# Patient Record
(demographics unavailable — no encounter records)

---

## 2024-10-16 NOTE — DISCUSSION/SUMMARY
[de-identified] : This patient presents today for follow-up regarding calcaneus fracture of the right foot.  Patient's x-ray looks stable to previous x-rays.  I did ask him to see Dr. Sanchez in consultation because foot and ankle is his specialty.  She did not go to see him as of yet.  I encouraged her to see him this week without failure.  She will continue the cam walker and toe-touch weightbearing with crutches.  Will continue to observe the hand for now.  We did inject the hand with Depo-Medrol lidocaine on today's visit.  At least 40 minutes was spent performing the evaluation and management on today's office visit.  This includes but is not limited to preparing to see patient including review of any test results or outside medical records, obtaining and/or reviewing separately obtained history, performing examination and evaluation, counseling and educating the patient on their diagnosis and treatment recommendations, ordering medications, tests, or procedures, documenting clinical information in the electronic health record, independently interpreting results (not separately reported) and communicating results to the patient, and coordination of care.

## 2024-10-16 NOTE — PHYSICAL EXAM
[de-identified] : The patient appears well nourished  and in no apparent distress.  The patient is alert and oriented to person, place, and time.   Affect and mood appear normal.    The head is normocephalic and atraumatic.  The eyes reveal normal sclera and extra ocular muscles are intact.   The neck appears normal with no jugular venous distention or masses noted.   Skin shows normal turgor with no evidence of eczema or psoriasis.  No respiratory distress noted.  The patient is unable to ambulate secondary to pain.  The right ankle has decreased range of motion secondary to pain.  Swelling and ecchymosis have reduced.  There is tenderness about the calcaneal tuberosity..  There is no warmth or erythema.    There is no instability to inversion or to anterior drawer.  Strength was not tested secondary to the injury.  Sensation is intact. Pulses and capillary refill are normal.    No lymphadenopathy noted.   The left wrist has mild loss of range of motion.  Tenderness over the oblique pulley of the thumb.  No active triggering.  Tenderness to palpation over the dorsal aspect of the wrist and the metacarpals.  Mild soft tissue swelling and ecchymosis noted as well.  No instability is noted.  There is no warmth or erythema.   strength is not tested secondary to the injury.  Pulses and capillary refill are normal.   No clubbing, cyanosis, or edema noted.  No lymphadenopathy noted.   [de-identified] : X-ray of the calcaneus lateral and axial view reveals there is a calcaneal tuberosity fracture with some slight proximal migration of the tuberosity fragment.  There is also evidence of a fracture line that extends up towards the subtalar joint which appears nondisplaced.

## 2024-10-16 NOTE — HISTORY OF PRESENT ILLNESS
[de-identified] : This patient presents today for follow-up regarding right calcaneus fracture.  I did ask him to see Dr. Sanchez but she has not gone to see him as of yet.  She is wearing the cam walker at all times and using crutches.  She does note some swelling about the ankle especially with prolonged standing.  On today's visit she complains of thumb pain with active triggering.  She has been wearing a cock-up wrist splint for the wrist sprain.  She notes intermittent triggering and locking about the thumb.  Denies numbness and tingling.

## 2024-10-16 NOTE — PROCEDURE
[de-identified] : Using sterile technique, 0.5cc of depomedrol (40mg/ml) and 0.5cc of 1% plain lidocaine was drawn up into a sterile 3cc syringe.  A sterile 25 gauge needle was then affixed to the syringe.  The base of the left thumb was then sterilely prepped with chlorhexidine and ethylene chloride spray was used as an anesthetic prior to injection.  The depomedrol/lidocaine mixture was injected into the flexor sheath of the digit.  The patient tolerated the procedure well without difficulty.  The patient was given instructions on the use of ice and anti-inflammatories post injection site soreness.

## 2024-10-21 NOTE — HEALTH RISK ASSESSMENT
[Good] : ~his/her~  mood as  good [Yes] : Yes [2 - 3 times a week (3 pts)] : 2 - 3  times a week (3 points) [1 or 2 (0 pts)] : 1 or 2 (0 points) [MammogramComments] : referral today

## 2024-10-21 NOTE — DISCUSSION/SUMMARY
[Surgical risks reviewed] : Surgical risks reviewed [de-identified] : Discussed with patient nature of condition, potential course / sequelae, options reviewed.  Continue CAM boot immobilization at all times (night splint for bedtime use), non-weight bearing status, activity modification, HEP, CT scan assessment.  Return to office in 10 - 14 days / PRN, advanced imaging study review.  All questions answered.

## 2024-10-21 NOTE — HISTORY OF PRESENT ILLNESS
[A CAM Boot] : a CAM boot [FreeTextEntry1] : Reports sustaining calcaneus fracture R hindfoot secondary to hard landing misstep at bottom of stairway (9/10/24).  Followed by Dr. Mcmanus with radiographs (9/11/24, 9/18/24, and 10/16/24).  Wearing CAM boot immobilization during daytime, TTWB status.  PMH includes osteoporosis (Alendronate medication).

## 2024-10-21 NOTE — HISTORY OF PRESENT ILLNESS
[de-identified] : 56y/o F PMHx Anxiety Depression (Zoloft Wellbutrin, Klonopin ), Hypothyroid (Levothyroxine) presents for routine PE. Wearing boot on right foot for calcaneus fracture after sustaining a fall 2 months ago

## 2024-10-21 NOTE — PHYSICAL EXAM
[de-identified] : Extremity: skin intact R LE (including posterior hindfoot), Castillo testing with good plantarflexion response R LE, able to actively plantarflex R ankle, residual tenderness calcaneal tuber R hindfoot.  Nontender R ankle, peroneals, syndesmosis, Achilles, midfoot LF and PTT insertional, and forefoot.  Calves soft and nontender, sensorimotor unchanged, skin as aforementioned R LE.  AOx3, mood / affect normal. [de-identified] : Radiographs (10/16/24) reveal fracture comminution R calcaneus with proximal migration tuberosity essentially unchanged [since 9/18/24] and intra-articular extension.

## 2024-12-03 NOTE — HISTORY OF PRESENT ILLNESS
[FreeTextEntry8] : 55 yo female with PMHx anxiety/depression, osteoporosis, hypothyroid, presents to the office for cough/congestion, sinus pressure. reports symptoms started mid last week, felt congested with sore throat. has post nasal drip, developing into persistent coughing, keeping patient up at night due to coughing. has some chills. tried OTC medications without relief.

## 2024-12-03 NOTE — REVIEW OF SYSTEMS
[Chills] : chills [Fatigue] : fatigue [Night Sweats] : night sweats [Earache] : no earache [Hearing Loss] : no hearing loss [Nosebleed] : no nosebleeds [Hoarseness] : hoarseness [Nasal Discharge] : nasal discharge [Sore Throat] : sore throat [Postnasal Drip] : postnasal drip [Shortness Of Breath] : no shortness of breath [Wheezing] : no wheezing [Cough] : cough [Dyspnea on Exertion] : dyspnea on exertion [Headache] : headache [Dizziness] : no dizziness [Fainting] : no fainting [Confusion] : no confusion [Memory Loss] : no memory loss [Unsteady Walking] : no ataxia [Negative] : Psychiatric

## 2024-12-03 NOTE — PHYSICAL EXAM
[No Acute Distress] : no acute distress [Normal Voice/Communication] : normal voice/communication [Ill-Appearing] : ill-appearing [No Lymphadenopathy] : no lymphadenopathy [No Respiratory Distress] : no respiratory distress  [No Accessory Muscle Use] : no accessory muscle use [Normal Rate] : normal rate  [Regular Rhythm] : with a regular rhythm [Normal S1, S2] : normal S1 and S2 [Normal Supraclavicular Nodes] : no supraclavicular lymphadenopathy [Coordination Grossly Intact] : coordination grossly intact [No Focal Deficits] : no focal deficits [Normal Gait] : normal gait [Speech Grossly Normal] : speech grossly normal [Alert and Oriented x3] : oriented to person, place, and time [Normal Mood] : the mood was normal [Normal] : affect was normal and insight and judgment were intact [de-identified] : PND, bilateral TM fluid [de-identified] : rhonchi bilaterally

## 2024-12-03 NOTE — PHYSICAL EXAM
[No Acute Distress] : no acute distress [Normal Voice/Communication] : normal voice/communication [Ill-Appearing] : ill-appearing [No Lymphadenopathy] : no lymphadenopathy [No Respiratory Distress] : no respiratory distress  [No Accessory Muscle Use] : no accessory muscle use [Normal Rate] : normal rate  [Regular Rhythm] : with a regular rhythm [Normal S1, S2] : normal S1 and S2 [Normal Supraclavicular Nodes] : no supraclavicular lymphadenopathy [Coordination Grossly Intact] : coordination grossly intact [No Focal Deficits] : no focal deficits [Normal Gait] : normal gait [Speech Grossly Normal] : speech grossly normal [Alert and Oriented x3] : oriented to person, place, and time [Normal Mood] : the mood was normal [Normal] : affect was normal and insight and judgment were intact [de-identified] : PND, bilateral TM fluid [de-identified] : rhonchi bilaterally

## 2024-12-24 NOTE — HISTORY OF PRESENT ILLNESS
[FreeTextEntry8] : 57 y/o F Presents for ;left armpit lump noticed 4 days ago.  Had recent Mammo and Breast US 2 weeks ago all negative.  Having difficulty swallowing. Food not getting stuck but difficult swallowing pills. Sees Endo for thyroid  Had Thyroid labs pending. LAst Thyroid US last year.

## 2024-12-24 NOTE — PHYSICAL EXAM
[Normal] : no jugular venous distention, supple, no lymphadenopathy and the thyroid was normal and there were no nodules present [de-identified] : Left axilla fullness> right [de-identified] : ? left axillary nodfe Non tedner

## 2024-12-24 NOTE — REVIEW OF SYSTEMS
[Negative] : Cardiovascular [FreeTextEntry4] : difficulty swallowing [de-identified] : lump under left armpit

## 2024-12-30 NOTE — DISCUSSION/SUMMARY
[de-identified] : Patient presents today for follow-up regarding continued left wrist pain.  The injections seem to help the trigger thumb however she now she is complaining of more pain over the radiocarpal joint.  Due to the patient's continued pain I recommend an MRI to evaluate the radiocarpal joint for evidence of synovitis or early degenerative arthritis or ligament injury.  I will see her back after the MRI for follow-up and review.  I also recommend a course of meloxicam 15 mg each day for 2 weeks.  This should help with her hip symptoms as well as the wrist symptoms.  I recommended a course of Mobic, 15mg per day for the next 2 weeks.  The patient was given a prescription for the Mobic with directions to take it once daily with the first meal of the day.  They were instructed to stop the medicine and call the office if there are any adverse reactions to the medicine.  At least 30 minutes was spent performing the evaluation and management on today's office visit.  This includes but is not limited to preparing to see patient including review of any test results or outside medical records, obtaining and/or reviewing separately obtained history, performing examination and evaluation, counseling and educating the patient on their diagnosis and treatment recommendations, ordering medications, tests, or procedures, documenting clinical information in the electronic health record, independently interpreting results (not separately reported) and communicating results to the patient, and coordination of care.

## 2024-12-30 NOTE — HISTORY OF PRESENT ILLNESS
[de-identified] : This patient presents today for follow-up and reevaluation regarding left thumb trigger finger.  She did have a cortisone injection in October of this year which gave her some relief of her symptoms.  She no longer has any pain but she does have occasional triggering of the left thumb.  She is noting some pain over the radiocarpal joint.  In addition she is given complaining about pain in the left hip which has been ongoing for a number of months after a fall she sustained in September.  Pain in the hip is 2 out of 10.  Pain in the wrist 5 out of 10.  Pain radiates around the lateral aspect of the hip joint in the area of the trochanter and the gluteus medius.  Pain worse when she goes from sitting to a standing position.  Pain also worse with ambulation and stair climbing.  Pain in the wrist is worse when she  objects or does any repetitive activity.  Patient feels the pain is better with rest and with Tylenol.  She presents today for follow-up and reevaluation.

## 2024-12-30 NOTE — HISTORY OF PRESENT ILLNESS
[de-identified] : This patient presents today for follow-up and reevaluation regarding left thumb trigger finger.  She did have a cortisone injection in October of this year which gave her some relief of her symptoms.  She no longer has any pain but she does have occasional triggering of the left thumb.  She is noting some pain over the radiocarpal joint.  In addition she is given complaining about pain in the left hip which has been ongoing for a number of months after a fall she sustained in September.  Pain in the hip is 2 out of 10.  Pain in the wrist 5 out of 10.  Pain radiates around the lateral aspect of the hip joint in the area of the trochanter and the gluteus medius.  Pain worse when she goes from sitting to a standing position.  Pain also worse with ambulation and stair climbing.  Pain in the wrist is worse when she  objects or does any repetitive activity.  Patient feels the pain is better with rest and with Tylenol.  She presents today for follow-up and reevaluation.

## 2024-12-30 NOTE — REASON FOR VISIT
[Follow-Up Visit] : a follow-up visit for [FreeTextEntry2] : Sprain of wrist, left . New problem left hip pain after fall 9/10/24

## 2024-12-30 NOTE — DISCUSSION/SUMMARY
[de-identified] : Patient presents today for follow-up regarding continued left wrist pain.  The injections seem to help the trigger thumb however she now she is complaining of more pain over the radiocarpal joint.  Due to the patient's continued pain I recommend an MRI to evaluate the radiocarpal joint for evidence of synovitis or early degenerative arthritis or ligament injury.  I will see her back after the MRI for follow-up and review.  I also recommend a course of meloxicam 15 mg each day for 2 weeks.  This should help with her hip symptoms as well as the wrist symptoms.  I recommended a course of Mobic, 15mg per day for the next 2 weeks.  The patient was given a prescription for the Mobic with directions to take it once daily with the first meal of the day.  They were instructed to stop the medicine and call the office if there are any adverse reactions to the medicine.  At least 30 minutes was spent performing the evaluation and management on today's office visit.  This includes but is not limited to preparing to see patient including review of any test results or outside medical records, obtaining and/or reviewing separately obtained history, performing examination and evaluation, counseling and educating the patient on their diagnosis and treatment recommendations, ordering medications, tests, or procedures, documenting clinical information in the electronic health record, independently interpreting results (not separately reported) and communicating results to the patient, and coordination of care.

## 2024-12-30 NOTE — PHYSICAL EXAM
[de-identified] : The patient appears well nourished  and in no apparent distress.  The patient is alert and oriented to person, place, and time.   Affect and mood appear normal.    The head is normocephalic and atraumatic.  The eyes reveal normal sclera and extra ocular muscles are intact.   The neck appears normal with no jugular venous distention or masses noted.   Skin shows normal turgor with no evidence of eczema or psoriasis.  No respiratory distress noted.  The patient ambulates with a normal gait.  The left wrist has decreased range of motion of a mild degree in all directions.  Tenderness noted over the radiocarpal joint to palpation.  Pain noted with terminal extension and flexion of the wrist.  There is no ecchymosis.  No instability is noted.  There is no warmth or erythema.   strength is normal.  Pulses and capillary refill are normal.   No clubbing, cyanosis, or edema noted.  No lymphadenopathy noted.    Exam of the left hip reveals there is mild discomfort with range of motion.  There is some very mild tenderness over the trochanteric and the gluteus medius muscle as it inserts onto the trochanter.  Strength and sensation are intact distally.  Reflexes 2+ and symmetric.  Pulses are intact. [de-identified] : X-ray of the left hip is obtained.  AP and frog lateral views of the left hip reveal joint spaces well-maintained.  No fracture dislocation or degenerative disease is noted.

## 2024-12-30 NOTE — PHYSICAL EXAM
[de-identified] : The patient appears well nourished  and in no apparent distress.  The patient is alert and oriented to person, place, and time.   Affect and mood appear normal.    The head is normocephalic and atraumatic.  The eyes reveal normal sclera and extra ocular muscles are intact.   The neck appears normal with no jugular venous distention or masses noted.   Skin shows normal turgor with no evidence of eczema or psoriasis.  No respiratory distress noted.  The patient ambulates with a normal gait.  The left wrist has decreased range of motion of a mild degree in all directions.  Tenderness noted over the radiocarpal joint to palpation.  Pain noted with terminal extension and flexion of the wrist.  There is no ecchymosis.  No instability is noted.  There is no warmth or erythema.   strength is normal.  Pulses and capillary refill are normal.   No clubbing, cyanosis, or edema noted.  No lymphadenopathy noted.    Exam of the left hip reveals there is mild discomfort with range of motion.  There is some very mild tenderness over the trochanteric and the gluteus medius muscle as it inserts onto the trochanter.  Strength and sensation are intact distally.  Reflexes 2+ and symmetric.  Pulses are intact. [de-identified] : X-ray of the left hip is obtained.  AP and frog lateral views of the left hip reveal joint spaces well-maintained.  No fracture dislocation or degenerative disease is noted.

## 2025-02-14 NOTE — PROCEDURE
[de-identified] : Reason for laryngoscopy: Oral exam unable to account for symptoms.  Flexible scope #2 used. Passed through nasal passage and nasopharynx/oropharynx/hypopharynx clear. Supraglottis normal. Glottis with fully mobile vocal cords without lesions or masses. Visualized subglottis clear. Postcricoid area without erythema or edema. No pooling of secretions.

## 2025-02-14 NOTE — ASSESSMENT
[FreeTextEntry1] : sensation of fullness in throat, poor sleep: - laryngoscopy WNL - recommend home sleep study  - will call with results

## 2025-02-14 NOTE — HISTORY OF PRESENT ILLNESS
[de-identified] : 55yo female she notes sometimes she has issues with swallowing pills and also a sensation of difficulty breathing when lying flat and needs to sit up. No snoring at night but she notes poor sleep. She sometimes awakens at night feeling she can't breathe. She does sleep with her head elevated.

## 2025-02-25 NOTE — PHYSICAL EXAM
[de-identified] : The patient appears well nourished  and in no apparent distress.  The patient is alert and oriented to person, place, and time.   Affect and mood appear normal.    The head is normocephalic and atraumatic.  The eyes reveal normal sclera and extra ocular muscles are intact.   The neck appears normal with no jugular venous distention or masses noted.   Skin shows normal turgor with no evidence of eczema or psoriasis.  No respiratory distress noted.  The patient ambulates with a normal gait.  The left wrist has decreased range of motion of a mild degree in all directions.  Tenderness noted over scaphoid to palpation..  Pain noted with terminal extension and flexion of the wrist.  There is no ecchymosis.  No instability is noted.  There is no warmth or erythema.   strength is normal.  Pulses and capillary refill are normal.   No clubbing, cyanosis, or edema noted.  No lymphadenopathy noted.    Exam of the right foot reveals there is normal range of motion about the foot, hindfoot, and toes.  There is some tenderness about the calcaneal tuberosity.  There is also some slight deformity secondary to calcaneus fracture.  There is no soft tissue swelling noted. There is no eccyhmosis.  There is no warmth or erythema. Flexor and extensor tendons are intact. Pulses are intact.  Capillary refill is good.   Strength and sensation are normal.  There is no lymphadenopathy.       [de-identified] : AP, lateral, and oblique views of the left wrist were obtained.  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  The joint spaces are well maintained without arthritic changes.   AP, lateral, and oblique views of the right foot were obtained.  There is evidence of prior calcaneal fracture which is healed.  The joints spaces are well maintained without evidence of degenerative arthritis.  The Lisfranc joint is intact.  No tarsal coalitions are noted.

## 2025-02-25 NOTE — PHYSICAL EXAM
[de-identified] : The patient appears well nourished  and in no apparent distress.  The patient is alert and oriented to person, place, and time.   Affect and mood appear normal.    The head is normocephalic and atraumatic.  The eyes reveal normal sclera and extra ocular muscles are intact.   The neck appears normal with no jugular venous distention or masses noted.   Skin shows normal turgor with no evidence of eczema or psoriasis.  No respiratory distress noted.  The patient ambulates with a normal gait.  The left wrist has decreased range of motion of a mild degree in all directions.  Tenderness noted over scaphoid to palpation..  Pain noted with terminal extension and flexion of the wrist.  There is no ecchymosis.  No instability is noted.  There is no warmth or erythema.   strength is normal.  Pulses and capillary refill are normal.   No clubbing, cyanosis, or edema noted.  No lymphadenopathy noted.    Exam of the right foot reveals there is normal range of motion about the foot, hindfoot, and toes.  There is some tenderness about the calcaneal tuberosity.  There is also some slight deformity secondary to calcaneus fracture.  There is no soft tissue swelling noted. There is no eccyhmosis.  There is no warmth or erythema. Flexor and extensor tendons are intact. Pulses are intact.  Capillary refill is good.   Strength and sensation are normal.  There is no lymphadenopathy.       [de-identified] : AP, lateral, and oblique views of the left wrist were obtained.  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  The joint spaces are well maintained without arthritic changes.   AP, lateral, and oblique views of the right foot were obtained.  There is evidence of prior calcaneal fracture which is healed.  The joints spaces are well maintained without evidence of degenerative arthritis.  The Lisfranc joint is intact.  No tarsal coalitions are noted.

## 2025-02-25 NOTE — DISCUSSION/SUMMARY
[de-identified] : This patient presents today for follow-up regarding left wrist sprain and right heel pain secondary to calcaneal fracture.  Due to the patient's continued pain about the wrist we obtained an MRI.  The MRI confirms evidence of a scaphoid nonunion which was not noted on the initial x-rays during presentation in September.  Patient was placed into a cock-up wrist splint after the MRI results were revealed.  I recommend a follow-up evaluation with Dr. Taylor for possible bone grafting and open reduction internal fixation of the scaphoid fracture.  In regards to her calcaneal fracture, I recommend she modify her shoewear and I do think her symptoms will improve with over-the-counter anti-inflammatories.  Should she have continued symptoms she should follow back up with Dr. Sanchez for further treatment options.  At least 30 minutes was spent performing the evaluation and management on today's office visit.  This includes but is not limited to preparing to see patient including review of any test results or outside medical records, obtaining and/or reviewing separately obtained history, performing examination and evaluation, counseling and educating the patient on their diagnosis and treatment recommendations, ordering medications, tests, or procedures, documenting clinical information in the electronic health record, independently interpreting results (not separately reported) and communicating results to the patient, and coordination of care.

## 2025-02-25 NOTE — HISTORY OF PRESENT ILLNESS
[de-identified] : This patient presents today for follow-up regarding left wrist sprain.  Patient had an MRI to rule out ligamentous injury.  MRI noted to have a nondisplaced fracture scaphoid.  Patient was instructed to obtain a cock-up wrist splints and to see his back in the office as soon as possible for follow-up and reevaluation.  In addition she is complaining of pain in the heel.  She was treated by Dr. Sanchez for a calcaneal fracture.

## 2025-02-25 NOTE — HISTORY OF PRESENT ILLNESS
[de-identified] : This patient presents today for follow-up regarding left wrist sprain.  Patient had an MRI to rule out ligamentous injury.  MRI noted to have a nondisplaced fracture scaphoid.  Patient was instructed to obtain a cock-up wrist splints and to see his back in the office as soon as possible for follow-up and reevaluation.  In addition she is complaining of pain in the heel.  She was treated by Dr. Sanchez for a calcaneal fracture.

## 2025-02-25 NOTE — REASON FOR VISIT
[Follow-Up Visit] : a follow-up visit for [FreeTextEntry2] : left wrist sprain, tendinitis of left hip

## 2025-02-25 NOTE — DISCUSSION/SUMMARY
[de-identified] : This patient presents today for follow-up regarding left wrist sprain and right heel pain secondary to calcaneal fracture.  Due to the patient's continued pain about the wrist we obtained an MRI.  The MRI confirms evidence of a scaphoid nonunion which was not noted on the initial x-rays during presentation in September.  Patient was placed into a cock-up wrist splint after the MRI results were revealed.  I recommend a follow-up evaluation with Dr. Taylor for possible bone grafting and open reduction internal fixation of the scaphoid fracture.  In regards to her calcaneal fracture, I recommend she modify her shoewear and I do think her symptoms will improve with over-the-counter anti-inflammatories.  Should she have continued symptoms she should follow back up with Dr. Sanchez for further treatment options.  At least 30 minutes was spent performing the evaluation and management on today's office visit.  This includes but is not limited to preparing to see patient including review of any test results or outside medical records, obtaining and/or reviewing separately obtained history, performing examination and evaluation, counseling and educating the patient on their diagnosis and treatment recommendations, ordering medications, tests, or procedures, documenting clinical information in the electronic health record, independently interpreting results (not separately reported) and communicating results to the patient, and coordination of care.

## 2025-03-07 NOTE — DISCUSSION/SUMMARY
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  She also has a right trigger thumb.  I had a discussion regarding today's visit, the prognosis of this diagnosis and treatment recommendations and options.  At this time, with regard to the left scaphoid nonunion, I recommended open reduction and internal fixation with distal radius bone graft.  She told me that she is afraid of surgery and afraid of hospitals and she is not certain that she would like to proceed.  She is also an active smoker, I did tell her that this would further impair her healing with or without surgery.  With regard to her right trigger thumb, she agreed to proceed with a cortisone injection today.  -  The nature and purposes of the operation/procedure was discussed in detail.  I discussed the surgical procedure in detail, as well as expected postoperative recovery and outcome. -  Possible risks, benefits, and complications (from known and unknown causes) of the procedure were discussed in detail.   -  Possible non-operative alternatives to the proposed treatment were discussed in detail.   -  He / She was told that possible risks/complications include, but are not limited to:  Infection, nerve or vessel injury, stiffness, painful scar, poor outcome, need for additional surgical procedures, and other unforeseen complications.   -  In addition, the possibility of an "unsuccessful outcome," despite "successful surgery," was discussed with the patient.  Specifically, we discussed the inherent nature of scaphoid nonunions and the possibility that the fracture still may not heal, that she may develop avascular necrosis, loss of fixation with a screw and possible subsequent arthritis including a SNAC wrist which could require further surgery in the future.  Understanding this, she has agreed to proceed. -  The patient states that she is not ready to do surgery and she is afraid.  Again, she does understand that her wrist will not improve and she is at high risk for SNAC arthritis.  Understanding this, at this time she has deferred surgery.  If she decides to proceed, then she will return to the office to discuss this further.   -  I had a lengthy discussion with the patient regarding today's visit, the diagnosis, and my surgical treatment recommendations.  Again, at this time she has deferred surgery.  She does understand the potential risks of not proceeding.  My cumulative time spent on this patient's visit was 45 minutes and included: Preparation for the visit, review of the medical records, review of pertinent diagnostic studies, examination and counseling of the patient on the above diagnosis, treatment plan and prognosis, orders of diagnostic tests, medications and/or appropriate procedures and documentation in the medical records of today's visit.

## 2025-03-07 NOTE — HISTORY OF PRESENT ILLNESS
[Right] : right hand dominant [FreeTextEntry1] : She comes in today for evaluation of an injury to her left wrist approximately 9 months ago.  She fell and also sustained a right calcaneus fracture.  She has complaints of left wrist pain and stiffness.  She also has complaints of recent right thumb pain and triggering.  She has had a left trigger thumb.  She was previously given a cortisone injection by Dr. Mcmanus.  She no longer has symptoms at the left thumb.  She is a smoker.  She was referred by Dr. Mcmanus.

## 2025-03-07 NOTE — PHYSICAL EXAM
[de-identified] : - Constitutional: This is a female in no obvious distress.   - Psych: Patient is alert and oriented to person, place and time.  Patient has a normal mood and affect. - Cardiovascular: Normal pulses throughout the upper extremities.  No significant varicosities are noted in the upper extremities.  - Neuro: Strength and sensation are intact throughout the upper extremities.  Patient has normal coordination. - Respiratory:  Patient exhibits no evidence of shortness of breath or difficulty breathing. - Skin: No rashes, lesions, or other abnormalities are noted in the upper extremities.  ---  Examination of her left wrist demonstrates no obvious swelling.  She is tender along the snuffbox and along the scaphoid tubercle.  She has approximately 30 degrees of wrist flexion and extension.  She has full flexion and extension of the digits.  There is no swelling or tenderness of the A1 pulley of the thumb.  There is no triggering of the left thumb.  Examination of her right hand demonstrates swelling and tenderness along the A1 pulley of the thumb.  There is triggering.  She is neurovascularly intact distally. [de-identified] : I reviewed radiographs of her left wrist dated 2/20/2025.  These demonstrated a scaphoid nonunion.  I reviewed an MRI of her left wrist dated 2/14/2025.  This demonstrated: MRI of the left wrist demonstrates an acute/subacute fracture of the scaphoid waist with adjacent bone marrow and soft tissue edema. Heterogeneous attenuation at the scapholunate ligament suggestive of low to intermediate grade sprain. Degenerative changes of the TFCC.

## 2025-03-07 NOTE — PROCEDURE
[FreeTextEntry1] : -  After a discussion of risks and benefits, the patient agreed to proceed with a cortisone injection.   -  Side: Right  -  Finger: Trigger thumb -  Medications: 0.5 cc of 1% Lidocaine and 1 cc of Celestone Soluspan, 6mg/cc, using sterile technique. -  Patient tolerated the procedure well, without complications. -  Patient was told that the symptoms may worsen for a day or two, and should then begin to improve.  -  Instructions: Patient was instructed on activity modification for the next several days. -  Follow-up: Within 4 weeks to assess response to the injection.

## 2025-03-27 NOTE — PHYSICAL EXAM
[Normal] : normal rate, regular rhythm, normal S1 and S2 and no murmur heard [Soft] : abdomen soft [Non Tender] : non-tender [No Masses] : no abdominal mass palpated [Normal Bowel Sounds] : normal bowel sounds [de-identified] : +mildly distended [de-identified] : anxious and depressed affect

## 2025-03-27 NOTE — HISTORY OF PRESENT ILLNESS
[de-identified] : 57 y/o F Sertraline, Wellbutrin  Presents for f/up. Has diarrhea after eating and abdominal cramping . Now taking Sertraline 50mg (down from 100). Feeling anxious and depressed Feels that Shes gaining weight. Had GYN workup  pelvic sono according to patient had today was negative

## 2025-03-27 NOTE — REVIEW OF SYSTEMS
[Abdominal Pain] : abdominal pain [Nausea] : nausea [Diarrhea] : diarrhea [Anxiety] : anxiety [Depression] : depression [Negative] : Genitourinary

## 2025-05-13 NOTE — HISTORY OF PRESENT ILLNESS
[de-identified] : 55 y/o F Sertraline, Wellbutrin  Presents for f/up. Has diarrhea after eating and abdominal cramping  Taking Sertraline and Wellbutrin feeling less depressed and anxious. Sees Psychiatrist . Continues to have GI issues

## 2025-05-13 NOTE — PHYSICAL EXAM
[Normal] : normal rate, regular rhythm, normal S1 and S2 and no murmur heard [Soft] : abdomen soft [Non Tender] : non-tender [No Masses] : no abdominal mass palpated [Normal Bowel Sounds] : normal bowel sounds [de-identified] : +mildly distended [de-identified] : anxious and depressed affect